# Patient Record
Sex: MALE | Race: WHITE | Employment: OTHER | URBAN - METROPOLITAN AREA
[De-identification: names, ages, dates, MRNs, and addresses within clinical notes are randomized per-mention and may not be internally consistent; named-entity substitution may affect disease eponyms.]

---

## 2019-07-03 ENCOUNTER — APPOINTMENT (OUTPATIENT)
Dept: CT IMAGING | Facility: CLINIC | Age: 69
End: 2019-07-03
Attending: EMERGENCY MEDICINE
Payer: MEDICARE

## 2019-07-03 ENCOUNTER — HOSPITAL ENCOUNTER (EMERGENCY)
Facility: CLINIC | Age: 69
Discharge: HOME | End: 2019-07-03
Attending: EMERGENCY MEDICINE | Admitting: EMERGENCY MEDICINE
Payer: MEDICARE

## 2019-07-03 VITALS
DIASTOLIC BLOOD PRESSURE: 75 MMHG | SYSTOLIC BLOOD PRESSURE: 123 MMHG | TEMPERATURE: 97.8 F | OXYGEN SATURATION: 97 % | RESPIRATION RATE: 20 BRPM | HEART RATE: 65 BPM

## 2019-07-03 DIAGNOSIS — N13.2 HYDRONEPHROSIS WITH URINARY OBSTRUCTION DUE TO URETERAL CALCULUS: ICD-10-CM

## 2019-07-03 DIAGNOSIS — N20.0 NEPHROLITHIASIS: ICD-10-CM

## 2019-07-03 DIAGNOSIS — N23 RENAL COLIC: ICD-10-CM

## 2019-07-03 DIAGNOSIS — R11.2 NON-INTRACTABLE VOMITING WITH NAUSEA, UNSPECIFIED VOMITING TYPE: ICD-10-CM

## 2019-07-03 LAB
ALBUMIN UR-MCNC: 30 MG/DL
ANION GAP SERPL CALCULATED.3IONS-SCNC: 6 MMOL/L (ref 3–14)
APPEARANCE UR: ABNORMAL
BASOPHILS # BLD AUTO: 0 10E9/L (ref 0–0.2)
BASOPHILS NFR BLD AUTO: 0.3 %
BILIRUB UR QL STRIP: NEGATIVE
BUN SERPL-MCNC: 22 MG/DL (ref 7–30)
CALCIUM SERPL-MCNC: 9.2 MG/DL (ref 8.5–10.1)
CHLORIDE SERPL-SCNC: 109 MMOL/L (ref 94–109)
CO2 SERPL-SCNC: 26 MMOL/L (ref 20–32)
COLOR UR AUTO: YELLOW
CREAT SERPL-MCNC: 0.84 MG/DL (ref 0.66–1.25)
DIFFERENTIAL METHOD BLD: ABNORMAL
EOSINOPHIL # BLD AUTO: 0 10E9/L (ref 0–0.7)
EOSINOPHIL NFR BLD AUTO: 0.2 %
ERYTHROCYTE [DISTWIDTH] IN BLOOD BY AUTOMATED COUNT: 13.1 % (ref 10–15)
GFR SERPL CREATININE-BSD FRML MDRD: 89 ML/MIN/{1.73_M2}
GLUCOSE SERPL-MCNC: 112 MG/DL (ref 70–99)
GLUCOSE UR STRIP-MCNC: NEGATIVE MG/DL
HCT VFR BLD AUTO: 44.2 % (ref 40–53)
HGB BLD-MCNC: 15.1 G/DL (ref 13.3–17.7)
HGB UR QL STRIP: ABNORMAL
HYALINE CASTS #/AREA URNS LPF: 8 /LPF (ref 0–2)
IMM GRANULOCYTES # BLD: 0 10E9/L (ref 0–0.4)
IMM GRANULOCYTES NFR BLD: 0.3 %
KETONES UR STRIP-MCNC: 40 MG/DL
LEUKOCYTE ESTERASE UR QL STRIP: NEGATIVE
LYMPHOCYTES # BLD AUTO: 0.4 10E9/L (ref 0.8–5.3)
LYMPHOCYTES NFR BLD AUTO: 4.9 %
MCH RBC QN AUTO: 31.9 PG (ref 26.5–33)
MCHC RBC AUTO-ENTMCNC: 34.2 G/DL (ref 31.5–36.5)
MCV RBC AUTO: 93 FL (ref 78–100)
MONOCYTES # BLD AUTO: 0.4 10E9/L (ref 0–1.3)
MONOCYTES NFR BLD AUTO: 4.2 %
MUCOUS THREADS #/AREA URNS LPF: PRESENT /LPF
NEUTROPHILS # BLD AUTO: 7.8 10E9/L (ref 1.6–8.3)
NEUTROPHILS NFR BLD AUTO: 90.1 %
NITRATE UR QL: NEGATIVE
NRBC # BLD AUTO: 0 10*3/UL
NRBC BLD AUTO-RTO: 0 /100
PH UR STRIP: 6 PH (ref 5–7)
PLATELET # BLD AUTO: 173 10E9/L (ref 150–450)
POTASSIUM SERPL-SCNC: 4.1 MMOL/L (ref 3.4–5.3)
RBC # BLD AUTO: 4.74 10E12/L (ref 4.4–5.9)
RBC #/AREA URNS AUTO: >182 /HPF (ref 0–2)
SODIUM SERPL-SCNC: 141 MMOL/L (ref 133–144)
SOURCE: ABNORMAL
SP GR UR STRIP: 1.02 (ref 1–1.03)
SQUAMOUS #/AREA URNS AUTO: <1 /HPF (ref 0–1)
UROBILINOGEN UR STRIP-MCNC: NORMAL MG/DL (ref 0–2)
WBC # BLD AUTO: 8.7 10E9/L (ref 4–11)
WBC #/AREA URNS AUTO: 1 /HPF (ref 0–5)

## 2019-07-03 PROCEDURE — 96360 HYDRATION IV INFUSION INIT: CPT

## 2019-07-03 PROCEDURE — 85025 COMPLETE CBC W/AUTO DIFF WBC: CPT | Performed by: EMERGENCY MEDICINE

## 2019-07-03 PROCEDURE — 99284 EMERGENCY DEPT VISIT MOD MDM: CPT | Mod: 25

## 2019-07-03 PROCEDURE — 25000128 H RX IP 250 OP 636: Performed by: EMERGENCY MEDICINE

## 2019-07-03 PROCEDURE — 80048 BASIC METABOLIC PNL TOTAL CA: CPT | Performed by: EMERGENCY MEDICINE

## 2019-07-03 PROCEDURE — 81001 URINALYSIS AUTO W/SCOPE: CPT | Performed by: EMERGENCY MEDICINE

## 2019-07-03 PROCEDURE — 74176 CT ABD & PELVIS W/O CONTRAST: CPT

## 2019-07-03 RX ORDER — OXYCODONE AND ACETAMINOPHEN 5; 325 MG/1; MG/1
1-2 TABLET ORAL EVERY 4 HOURS PRN
Qty: 12 TABLET | Refills: 0 | Status: SHIPPED | OUTPATIENT
Start: 2019-07-03

## 2019-07-03 RX ORDER — ONDANSETRON 4 MG/1
4 TABLET, ORALLY DISINTEGRATING ORAL EVERY 8 HOURS PRN
Qty: 10 TABLET | Refills: 0 | Status: SHIPPED | OUTPATIENT
Start: 2019-07-03 | End: 2019-07-06

## 2019-07-03 RX ORDER — TAMSULOSIN HYDROCHLORIDE 0.4 MG/1
0.4 CAPSULE ORAL DAILY
Qty: 10 CAPSULE | Refills: 0 | Status: SHIPPED | OUTPATIENT
Start: 2019-07-03 | End: 2019-07-13

## 2019-07-03 RX ADMIN — SODIUM CHLORIDE 1000 ML: 9 INJECTION, SOLUTION INTRAVENOUS at 21:46

## 2019-07-03 NOTE — ED AVS SNAPSHOT
[facility] Emergency Department  [address]  [facility] [address]  Phone:  [phone]  Fax:  [phone]                                    Saurav Saravia   MRN: [id number]    Department:  [facility] Emergency Department   Date of Visit:  7/3/2019           After Visit Summary Signature Page    I have received my discharge instructions, and my questions have been answered. I have discussed any challenges I see with this plan with the nurse or doctor.    ..........................................................................................................................................  Patient/Patient Representative Signature      ..........................................................................................................................................  Patient Representative Print Name and Relationship to Patient    ..................................................               ................................................  Date                                   Time    ..........................................................................................................................................  Reviewed by Signature/Title    ...................................................              ..............................................  Date                                               Time          22EPIC Rev 08/18

## 2019-07-04 NOTE — ED TRIAGE NOTES
Left lower back pain and vomiting started yesterday. Patient sent from clinic-patient states they gave a sot of Toradol and tested his urine. Patient states he has a history of kidney stones.   ABC intact alert and no distress.

## 2019-07-04 NOTE — ED PROVIDER NOTES
Visit Date:   07/03/2019      CHIEF COMPLAINT:  Left back pain.      HISTORY OF PRESENT ILLNESS:  Saurav Saravia is a 69-year-old male with history of nephrolithiasis who presents to the Emergency Department with concerns for left low mid back pain since last night.  He notes he can think too much of it last night, but was nagging him all day.  Around 2:00 p.m. it severely increased in severity with associated nausea and vomiting.  He notes chills but no fever.  He felt a little lightheaded at urgent care where he went prior to evaluation here.  He denies any hematuria or changes in urination.  Denies any bowel changes.  He denies any chest pain, shortness of breath or any other concerns.      ALLERGIES:  NO KNOWN DRUG ALLERGIES.      MEDICATIONS:  None.      PAST MEDICAL HISTORY:  COPD, kidney stones, duplicated renal collecting system, fungal infection or lung.      SURGICAL HISTORY:  Urologic surgery for double left ureter.      SOCIAL HISTORY:  The patient denies smoking.  He is here with his wife.      FAMILY HISTORY:  Noncontributory.      REVIEW OF SYSTEMS:  As noted in history of present illness.  All other systems are reviewed and negative.      PHYSICAL EXAMINATION:   VITAL SIGNS:  Blood pressure is 140/18, recheck 123/75, pulse was 52, temperature is 97.8, respiratory rate is 20, oxygen saturation 99% on room air.   GENERAL:  Reveals an adult male sitting upright.   EYES:  Pupils equally round, react to light.  Conjunctivae are normal.   EARS, NOSE, THROAT:  Moist mucous membranes.  Oropharynx is clear.   CARDIOVASCULAR:  Normal S1, S2.  Regular rate and rhythm.  No murmurs, rubs or gallops.   RESPIRATORY:  Clear to auscultation bilaterally.  No wheezes, rales or rhonchi.   GASTROINTESTINAL:  Soft, nondistended.  Mild tenderness in the right mid abdomen to deep palpation.  CVA tenderness on the right to percussion.   MUSCULOSKELETAL:  Moves all extremities equally.  No extremity edema.  Nontender.   SKIN:   Warm and dry.  No rash, lesions or ecchymoses on visible skin.   NEUROLOGIC:  Alert and oriented x3.  No focal neurologic findings.  Responds well to questions and commands.   PSYCHIATRIC:  Normal affect.  Pleasant male.      LABORATORY AND DIAGNOSTICS:      Basic metabolic panel is normal aside from slightly elevated glucose at 112.        CBC with platelets and differential is within normal limits aside from slightly decreased absolute lymphocyte count of 0.4.      Microscopic urinalysis shows slightly cloudy yellow urine with 40 ketones, large blood, 30 protein, greater than 182 red blood cells, 1 white blood cell, mucus present and 8 hyaline casts.      Abdominal pelvis CT, no contrast.        Impression:   1.  Mild left hydronephrosis secondary to small stones measuring 0.5 cm in the mid ureter and 0.4 cm in the proximal left ureter.   2.  Multiple left renal stones measuring up to 1 cm.   3.  Colonic diverticulosis.      INTERVENTIONS:    Normal saline 1 liter IV fluid bolus.      EMERGENCY DEPARTMENT COURSE:   The patient roomed and examined by me.  Peripheral IV placed.  Blood drawn and sent to lab.      Interventions as above.     The patient reassessed.  Denies any significant ongoing symptoms.  Feels comfortable with plan for discharge home.  I discussed all findings on today's evaluation.  All questions were answered prior to discharge.      The patient was discharged home with his wife.  He understands the importance of appropriate followup with Urology and/or primary care clinic as well has return if symptoms worsen.      MEDICAL DECISION MAKING:  Saurav Saravia is a 69-year-old male with history of kidney stones, who presents to the Emergency Department with concern for low back pain associated with nausea, vomiting.  His symptoms had nearly resolved after Toradol administered by urgent care.  Here in the Emergency Department, given a liter of IV fluid.  His symptoms seem most consistent with renal colic.   This was confirmed by CT scan.  There is no other acute pathology that suggested involvement of the abdominal aorta or evidence of obstruction or other acute pathology in the abdomen on CT scan.  His renal function was normal and there are no signs of urinary tract infection.  He has no leukocytosis.  At this time,  he was nearly pain-free and I feel comfortable discharging him home.  Sent home with Flomax, Zofran, Percocet for more severe pain.  Recommend drink plenty of fluids and follow up with Urology in 3-5 days as needed, reassessment with PCP as needed as well.  Recommended to strain his urine and keep the stone for analysis as needed.  He is to return to the Emergency Department with uncontrolled pain, uncontrolled vomiting, fevers or any other worsening symptoms.  All questions were answered prior to discharge.      DISPOSITION:  Discharged home with his wife in improved condition.      DIAGNOSES:   1.  Renal colic.   2.  Hydronephrosis with left ureteral stone causing obstruction.   3.  Nausea with vomiting  dictated.   4.  Nephrolithiasis.         ARGENTINA VERGARA MD             D: 2019   T: 2019   MT: PAUL      Name:     GALINDO PICKARD   MRN:      [id number]        Account:      [id number]   :      1950           Visit Date:   2019      Document: [id number]